# Patient Record
Sex: MALE | Race: WHITE | NOT HISPANIC OR LATINO | URBAN - METROPOLITAN AREA
[De-identification: names, ages, dates, MRNs, and addresses within clinical notes are randomized per-mention and may not be internally consistent; named-entity substitution may affect disease eponyms.]

---

## 2017-10-19 ENCOUNTER — OFFICE VISIT - HEALTHEAST (OUTPATIENT)
Dept: UROLOGY | Facility: CLINIC | Age: 22
End: 2017-10-19

## 2017-10-19 DIAGNOSIS — N20.9 URINARY TRACT STONES: ICD-10-CM

## 2017-10-19 DIAGNOSIS — N20.1 CALCULUS OF URETER: ICD-10-CM

## 2017-10-19 DIAGNOSIS — N13.2 HYDRONEPHROSIS WITH URINARY OBSTRUCTION DUE TO URETERAL CALCULUS: ICD-10-CM

## 2017-10-19 DIAGNOSIS — N20.0 CALCULUS OF KIDNEY: ICD-10-CM

## 2017-12-22 ENCOUNTER — HOSPITAL ENCOUNTER (OUTPATIENT)
Dept: CT IMAGING | Facility: CLINIC | Age: 22
Discharge: HOME | End: 2017-12-22
Attending: SPECIALIST

## 2017-12-22 ENCOUNTER — OFFICE VISIT - HEALTHEAST (OUTPATIENT)
Dept: UROLOGY | Facility: CLINIC | Age: 22
End: 2017-12-22

## 2017-12-22 DIAGNOSIS — N20.0 CALCULUS OF KIDNEY: ICD-10-CM

## 2017-12-22 DIAGNOSIS — N23 RENAL COLIC: ICD-10-CM

## 2017-12-22 DIAGNOSIS — N20.1 CALCULUS OF URETER: ICD-10-CM

## 2017-12-29 ENCOUNTER — ANESTHESIA - HEALTHEAST (OUTPATIENT)
Dept: SURGERY | Facility: CLINIC | Age: 22
End: 2017-12-29

## 2017-12-29 ENCOUNTER — SURGERY - HEALTHEAST (OUTPATIENT)
Dept: SURGERY | Facility: CLINIC | Age: 22
End: 2017-12-29

## 2017-12-29 ASSESSMENT — MIFFLIN-ST. JEOR: SCORE: 1963.55

## 2018-01-04 ENCOUNTER — AMBULATORY - HEALTHEAST (OUTPATIENT)
Dept: UROLOGY | Facility: CLINIC | Age: 23
End: 2018-01-04

## 2018-01-04 DIAGNOSIS — N20.9 URINARY TRACT STONES: ICD-10-CM

## 2018-01-04 DIAGNOSIS — N20.1 CALCULUS OF URETER: ICD-10-CM

## 2018-01-04 LAB
ALBUMIN UR-MCNC: ABNORMAL MG/DL
APPEARANCE UR: ABNORMAL
BILIRUB UR QL STRIP: NEGATIVE
COLOR UR AUTO: ABNORMAL
GLUCOSE UR STRIP-MCNC: NEGATIVE MG/DL
HGB UR QL STRIP: ABNORMAL
KETONES UR STRIP-MCNC: NEGATIVE MG/DL
LEUKOCYTE ESTERASE UR QL STRIP: ABNORMAL
NITRATE UR QL: NEGATIVE
PH UR STRIP: 6 [PH] (ref 5–8)
SP GR UR STRIP: >=1.03 (ref 1–1.03)
UROBILINOGEN UR STRIP-ACNC: ABNORMAL

## 2018-01-05 LAB — BACTERIA SPEC CULT: NO GROWTH

## 2018-02-01 ENCOUNTER — OFFICE VISIT - HEALTHEAST (OUTPATIENT)
Dept: UROLOGY | Facility: CLINIC | Age: 23
End: 2018-02-01

## 2018-02-01 ENCOUNTER — AMBULATORY - HEALTHEAST (OUTPATIENT)
Dept: LAB | Facility: CLINIC | Age: 23
End: 2018-02-01

## 2018-02-01 DIAGNOSIS — N20.9 URINARY TRACT STONES: ICD-10-CM

## 2018-02-01 DIAGNOSIS — N20.9 URINARY CALCULUS: ICD-10-CM

## 2018-02-01 DIAGNOSIS — N20.0 CALCULUS OF KIDNEY: ICD-10-CM

## 2018-02-01 LAB
ALBUMIN SERPL-MCNC: 4 G/DL (ref 3.5–5)
ALBUMIN UR-MCNC: NEGATIVE MG/DL
ANION GAP SERPL CALCULATED.3IONS-SCNC: 9 MMOL/L (ref 5–18)
APPEARANCE UR: CLEAR
BILIRUB UR QL STRIP: NEGATIVE
BUN SERPL-MCNC: 19 MG/DL (ref 8–22)
CALCIUM SERPL-MCNC: 9.6 MG/DL (ref 8.5–10.5)
CALCIUM SERPL-MCNC: 9.6 MG/DL (ref 8.5–10.5)
CALCIUM, IONIZED MEASURED: 1.2 MMOL/L (ref 1.11–1.3)
CHLORIDE BLD-SCNC: 107 MMOL/L (ref 98–107)
CO2 SERPL-SCNC: 26 MMOL/L (ref 22–31)
COLOR UR AUTO: YELLOW
CREAT SERPL-MCNC: 1.19 MG/DL (ref 0.7–1.3)
CREAT SERPL-MCNC: 1.25 MG/DL (ref 0.7–1.3)
GFR SERPL CREATININE-BSD FRML MDRD: >60 ML/MIN/1.73M2
GFR SERPL CREATININE-BSD FRML MDRD: >60 ML/MIN/1.73M2
GLUCOSE BLD-MCNC: 86 MG/DL (ref 70–125)
GLUCOSE UR STRIP-MCNC: NEGATIVE MG/DL
HGB UR QL STRIP: NEGATIVE
ION CA PH 7.4: 1.18 MMOL/L (ref 1.11–1.3)
KETONES UR STRIP-MCNC: NEGATIVE MG/DL
LEUKOCYTE ESTERASE UR QL STRIP: NEGATIVE
MAGNESIUM SERPL-MCNC: 2.1 MG/DL (ref 1.8–2.6)
NITRATE UR QL: NEGATIVE
PH UR STRIP: 6.5 [PH] (ref 5–8)
PH: 7.36 (ref 7.35–7.45)
PHOSPHATE SERPL-MCNC: 3.1 MG/DL (ref 2.5–4.5)
PHOSPHATE SERPL-MCNC: 3.2 MG/DL (ref 2.5–4.5)
POTASSIUM BLD-SCNC: 3.9 MMOL/L (ref 3.5–5)
PTH-INTACT SERPL-MCNC: 27 PG/ML (ref 10–86)
SODIUM SERPL-SCNC: 142 MMOL/L (ref 136–145)
SP GR UR STRIP: 1.02 (ref 1–1.03)
URATE SERPL-MCNC: 7.1 MG/DL (ref 3–8)
UROBILINOGEN UR STRIP-ACNC: NORMAL

## 2021-05-31 VITALS — BODY MASS INDEX: 30.53 KG/M2 | HEIGHT: 70 IN | WEIGHT: 213.25 LBS

## 2021-06-13 NOTE — PROGRESS NOTES
Assessment/Plan:        Diagnoses and all orders for this visit:    Calculus of ureter    Urinary tract stones  -     Urinalysis Macroscopic    Calculus of kidney  -     Stone Formation, 24 Hour Urine x2; Standing  -     Renal Function Profile; Future; Expected date: 11/2/17  -     Uric Acid; Future; Expected date: 11/2/17  -     Magnesium; Future; Expected date: 11/2/17  -     Calcium, Ionized, Measured; Future; Expected date: 11/2/17  -     Parathyroid Hormone Intact with Minerals; Future; Expected date: 11/2/17  -     Patient Stated Goal: Prevent further stones  -     Patient Stated Goal: Manage future stone episodes  -     Patient Stated Goal: Pass my stone  -     24 Hour Urine Collection Steps Education    Hydronephrosis with urinary obstruction due to ureteral calculus      Stone Management Plan  [facility] 10/19/2017   Urinary Tract Infection No suspicion of infection   Renal Colic Asymptomatic at this time   Renal Failure No suspicion of renal failure   Current CT date 8/25/2017   Right sided stones? Yes   R Number of ureteral stones 1   R GSD of ureteral stones 4   R Location of ureteral stone Distal   R Number of kidney stones  1   R GSD of kidney stones < 2   R Hydronephrosis Mild   R Stone Event New event   Diagnosis date 8/25/2017   Initial location of primary symptomatic stone Distal   Initial GSD of primary symptomatic stone 4   R Current Plan MET   Left sided stones? Yes   L Number of kidney stones  2   L GSD of kidney stones < 2   L Hydronephrosis None   L Stone Event No current event   L Current Plan Observe   Observe rationale Limited stone burden with good prognosis for spontaneous passage             Subjective:      HPI  Mr. Kendall Landis is a 22 y.o.  male presenting to the [facility] [place] referred by [facility] for urolithiasis.    He is a first time unidentified composition stone former. He has no identified modifiable stone risk factors. He has  identified non-modifiable stone risks including:  early age at first stone and limited family history.    He was evaluated through [facility] 8/25/17 for complaint of intermittent, mid abdominal pain x 3 weeks. The pain was achy but at times stabbing. At its worst, it was 8/10 with no associated alleviating or aggravating factors. Significant associated symptoms at presentation included:  urinary frequency and urinary urgency.   Workup included labs which demonstrated microscopic hematuria without signs of infection, and mild renal insufficiency. A CT scan reported a 5 mm right distal ureteral stone and additional bilateral renal stones. He was discharged home with flomax, zofran, and norco. He has not seen a stone pass. He has had no pain or related symptoms over past 3 weeks.     He is asymptomatic at present. He denies symptoms of fever, chills, flank pain, nausea, vomiting, urinary frequency and dysuria.     CT scan from 8/25/17 is personally reviewed and demonstrates a mildly obstructing 4-5 mm right distal ureteral calculus. There is a < 2 mm right renal papillary tip calcification. Within the left kidney, there a a couple punctate papillary tip calcifications.    Significant labs from presentation include mild hematuria, no pyuria, negative nitrite, no bacteria, normal WBC, slightly elevated creatinine (1.30) and normal potassium; venous calcium elevated 10.6    PLAN    23 yo healthy male asymptomatic, diagnosed 2 months ago with an obstructing right distal ureteral calculus, no specimen retrieved. Additional punctate bilateral renal calculi with familial history of stone disease.    Given Kendall has been asymptomatic for past several weeks, will proceed a comprehensive stone risk evaluation. Serum stone risk chemistries including parathyroid hormone and ionized calcium will be obtained before next visit. Two 24 hour urine collections and dietary journal will be obtained at earliest covenience. Patient verbalized  understanding. Patient agrees with plan as discussed. He will return to clinic when labs are available.      If he has recurrent pain or symptoms, he will call our office and may require follow up imaging, but will avoid at this time.    For symptom control, he has vicodin and ondansetron available. Over the counter symptom control medications of ibuprofen and Dramamine were recommended.    Patient also seen and examined by JOÃO Hanson   Review of Systems  A 12 point comprehensive review of systems is negative except for HPI    Past Medical History:   Diagnosis Date     Kidney stone      Medical history non-contributory        Past Surgical History:   Procedure Laterality Date     NO PAST SURGERIES         No current outpatient prescriptions on file.     No current facility-administered medications for this visit.        No Known Allergies    Social History     Social History     Marital status: Single     Spouse name: N/A     Number of children: N/A     Years of education: N/A     Occupational History           Social History Main Topics     Smoking status: Never Smoker     Smokeless tobacco: Never Used     Alcohol use Yes      Comment: occas     Drug use: No     Sexual activity: Not on file     Other Topics Concern     Not on file     Social History Narrative       Family History   Problem Relation Age of Onset     Urolithiasis Father      Clotting disorder Paternal Uncle      Cancer Maternal Grandmother      Urolithiasis Paternal Grandmother      Diabetes Neg Hx      Gout Neg Hx      Heart disease Neg Hx        Objective:      Physical Exam  Vitals:    10/19/17 1354   BP: 127/90   Pulse: 68   Temp: 98  F (36.7  C)     General - well developed, well nourished, appropriate for age. Appears no distress at this time   Heart - regular rate and rhythm, no murmur  Respiratory - normal effort, clear to auscultation, good air entry without adventitious noises  Abdomen - slender soft, non-tender,  no hepatosplenomegaly, no masses.   - no flank tenderness, no suprapubic tenderness, kidney and bladder non-palpable  MSK - normal spinal curvature. no spinal tenderness. normal gait. muscular strength intact.  Neurology - cranial nerves II-XII grossly intact, normal sensation, no unsteadiness  Skin - intact, no bruising, no gouty tophi  Psych - oriented to time, place, and person, normal mood and affect.        Labs  Urinalysis POC (Office):  Nitrite, UA   Date Value Ref Range Status   10/19/2017 Negative Negative Final       Lab Urinalysis:  Blood, UA   Date Value Ref Range Status   10/19/2017 Negative Negative Final     Nitrite, UA   Date Value Ref Range Status   10/19/2017 Negative Negative Final     Leukocytes, UA   Date Value Ref Range Status   10/19/2017 Negative Negative Final     pH, UA   Date Value Ref Range Status   10/19/2017 7.0 5.0 - 8.0 Final

## 2021-06-14 NOTE — PROGRESS NOTES
Assessment/Plan:        Diagnoses and all orders for this visit:    Calculus of ureter  -     CT Abdomen Pelvis Without Oral Without IV Contrast; Future; Expected date: 12/22/17  -     Urinalysis Macroscopic  -     Place sequential compression device; Standing  -     tamsulosin (FLOMAX) 0.4 mg Cp24; Take 1 capsule (0.4 mg total) by mouth daily for 14 days.  Dispense: 14 capsule; Refill: 1  -     HYDROmorphone (DILAUDID) 2 MG tablet; Take 1-2 tablets (2-4 mg total) by mouth every 4 (four) hours as needed for pain.  Dispense: 30 tablet; Refill: 0  -     Patient Stated Goal: Know what to expect after surgery  -     Ureteroscopy Education  -     XR Abdomen AP; Standing  -     Diet NPO; Standing  -     Verify informed consent; Standing  -     levoFLOXacin 500 mg/100 mL IVPB 500 mg (LEVAQUIN); Infuse 100 mL (500 mg total) into a venous catheter 60 minutes before surgery or procedure (On Call to OR).    Renal colic  -     CT Abdomen Pelvis Without Oral Without IV Contrast; Future; Expected date: 12/22/17  -     Urinalysis Macroscopic    Calculus of kidney  -     Ureteroscopy Education    Other orders  -     UNABLE TO FIND; Med Name:       Stone Management Plan  [facility] 10/19/2017 12/22/2017   Urinary Tract Infection No suspicion of infection No suspicion of infection   Renal Colic Asymptomatic at this time Asymptomatic at this time   Renal Failure No suspicion of renal failure No suspicion of renal failure   Current CT date 8/25/2017 12/22/2017   Right sided stones? Yes Yes   R Number of ureteral stones 1 1   R GSD of ureteral stones 4 5   R Location of ureteral stone Distal Distal   R Number of kidney stones  1 1   R GSD of kidney stones < 2 2 - 4   R Hydronephrosis Mild None   R Stone Event New event Established event   Diagnosis date 8/25/2017 -   Initial location of primary symptomatic stone Distal -   Initial GSD of primary symptomatic stone 4 -   R Current Plan MET Clear   Clear rationale -  MET failure   Left sided stones? Yes No   L Number of kidney stones  2 -   L GSD of kidney stones < 2 -   L Hydronephrosis None -   L Stone Event No current event No current event   L Current Plan Observe -   Observe rationale Limited stone burden with good prognosis for spontaneous passage -         Subjective:      HPI  Mr. Kendall Landis is a 22 y.o.  male returning to the [facility] [place] for unanticipated visit with acute exacerbation of chronic stone disease.      He is a first time unidentified composition stone former who has not required stone clearance procedures. He has not previously participated in stone risk evaluation. He has no identified modifiable stone risk factors. He has identified non-modifiable stone risks including:  early age at first stone and limited family history.    Kendall was last seen in August following an urgent care visit with diagnosed right distal ureteral calculus. He had been asymptomatic for weeks but had not captured a stone. He was to follow up after completing stone risk workup or return if he had recurrent symptoms. He states he has experienced mild intermittent pain over the past 4 months. However, over the last few days, the right flank pain has increased in severity. He has not required medications for relief. He notes episodic urinary urgency and frequency. He is asymptomatic at present. He denies symptoms of fever, chills, flank pain, nausea, vomiting, urinary frequency and dysuria.    CT scan from today is personally reviewed and demonstrates persistent right distal ureteral calculus, 5 mm. No current hydronephrosis. Slight growth of previous right upper pole calculus, now 2 mm. No discrete left renal calculi.    Significant labs from presentation include no hematuria, no pyuria, negative nitrite and no bacteria.    PLAN    23 yo healthy male with long standing, non-progressing, right distal ureteral calculus. Additional right renal  calculus.    Will proceed with ureterscopic stone clearance next week. Risks and benefits were detailed of ureteroscopic stone clearance including potential issues of urinary or systemic infection, ureteral injury, inaccessible stone, incomplete stone clearance, multiple surgeries, and stent related symptoms of urgency, frequency and hematuria Patient verbalized understanding. Patient agrees with plan as discussed. Preoperative evaluation with primary care is not requested as the referring documentation is adequate.    For symptom control, he was prescribed dilaudid and Flomax. Over the counter symptom control medications of ibuprofen, Dramamine and Tylenol were recommended.    Patient also seen and examined by JOÃO Hanson   Review of systems is negative except for HPI.    Past Medical History:   Diagnosis Date     Kidney stone      Medical history non-contributory        Past Surgical History:   Procedure Laterality Date     NO PAST SURGERIES         Current Outpatient Prescriptions   Medication Sig Dispense Refill     UNABLE TO FIND Med Name:        No current facility-administered medications for this visit.        No Known Allergies    Social History     Social History     Marital status: Single     Spouse name: N/A     Number of children: N/A     Years of education: N/A     Occupational History           Social History Main Topics     Smoking status: Never Smoker     Smokeless tobacco: Never Used     Alcohol use Yes      Comment: occas     Drug use: No     Sexual activity: Not on file     Other Topics Concern     Not on file     Social History Narrative       Family History   Problem Relation Age of Onset     Urolithiasis Father      Clotting disorder Paternal Uncle      Cancer Maternal Grandmother      Urolithiasis Paternal Grandmother      Diabetes Neg Hx      Gout Neg Hx      Heart disease Neg Hx      Objective:      Physical Exam  Vitals:    12/22/17 1030   BP: 125/81    Pulse: (!) 56   Temp: 98  F (36.7  C)     General - well developed, well nourished, appropriate for age. Appears no distress at this time  Abdomen - slender soft, non-tender, no hepatosplenomegaly, no masses.   - no flank tenderness, no suprapubic tenderness, kidney and bladder non-palpable  MSK - normal spinal curvature. no spinal tenderness. normal gait. muscular strength intact.  Psych - oriented to time, place, and person, normal mood and affect.        Labs   Urinalysis POC (Office):  Nitrite, UA   Date Value Ref Range Status   12/22/2017 Negative Negative Final   10/19/2017 Negative Negative Final       Lab Urinalysis:  Blood, UA   Date Value Ref Range Status   12/22/2017 Negative Negative Final   10/19/2017 Negative Negative Final     Nitrite, UA   Date Value Ref Range Status   12/22/2017 Negative Negative Final   10/19/2017 Negative Negative Final     Leukocytes, UA   Date Value Ref Range Status   12/22/2017 Negative Negative Final   10/19/2017 Negative Negative Final     pH, UA   Date Value Ref Range Status   12/22/2017 7.0 5.0 - 8.0 Final   10/19/2017 7.0 5.0 - 8.0 Final

## 2021-06-15 NOTE — PROGRESS NOTES
Assessment/Plan:        Diagnoses and all orders for this visit:    Calculus of ureter  -     Cystoscopy with Stent Removal Education    Urinary tract stones  -     Urinalysis Macroscopic  -     Culture, Urine- Future; Future; Expected date: 2/3/18  -     Culture, Urine- Future  -     lidocaine HCl 2 % topical jelly 10 mL (UROJET); Insert 10 mL into the urethra once.  -     ciprofloxacin HCl tablet 250 mg (CIPRO); Take 1 tablet (250 mg total) by mouth once.    Other orders  -     IBUPROFEN ORAL; Take by mouth.  -     ciprofloxacin HCl (CIPRO) 250 MG tablet;   -     lidocaine HCl (UROJET) 2 % topical jelly;       Stone Management Plan  [facility] [place] [facility] 10/19/2017 12/22/2017 1/4/2018   Urinary Tract Infection No suspicion of infection No suspicion of infection No suspicion of infection   Renal Colic Asymptomatic at this time Asymptomatic at this time Well controlled symptoms   Renal Failure No suspicion of renal failure No suspicion of renal failure No suspicion of renal failure   Current CT date 8/25/2017 12/22/2017 -   Right sided stones? Yes Yes -   R Number of ureteral stones 1 1 -   R GSD of ureteral stones 4 5 -   R Location of ureteral stone Distal Distal -   R Number of kidney stones  1 1 -   R GSD of kidney stones < 2 2 - 4 -   R Hydronephrosis Mild None -   R Stone Event New event Established event Established event   Diagnosis date 8/25/2017 - -   Initial location of primary symptomatic stone Distal - -   Initial GSD of primary symptomatic stone 4 - -   R Post-op status - - Stent Removal   R Current Plan MET Clear -   Clear rationale - MET failure -   Left sided stones? Yes No -   L Number of kidney stones  2 - -   L GSD of kidney stones < 2 - -   L Hydronephrosis None - -   L Stone Event No current event No current event No current event   L Current Plan Observe - -   Observe rationale Limited stone burden with good prognosis for spontaneous passage - -             Subjective:      HPI  Mr. Argueta DINORAH  Sabiha is a 22 y.o.  male returning to the [facility] [place] for early postoperative follow up for anticipated stent removal.     He returns status post right ureteroscopic extraction for distal ureteral stone. He has had no unanticipated post-operative events.    He has had no symptoms suspicious for infection and stent was mildly symptomatic with typical issues of flank discomfort and lower urinary tract irritation.     Flexible cystoscopy is performed and indwelling stent is removed without incident.    He will follow up in the office in one month without imaging.       ROS   Review of systems is negative except for HPI.    Past Medical History:   Diagnosis Date     Kidney stone      Medical history non-contributory        Past Surgical History:   Procedure Laterality Date     MS CYSTO/URETERO W/LITHOTRIPSY &INDWELL STENT INSRT Right 12/29/2017    Procedure: CYSTOSCOPY, RIGHT URETEROSCOPY STENT INSERTION;  Surgeon: Luis Armando Freire MD;  Location: [facility];  Service: Urology     WISDOM TOOTH EXTRACTION         Current Outpatient Prescriptions   Medication Sig Dispense Refill     HYDROmorphone (DILAUDID) 2 MG tablet Take 1-2 tablets (2-4 mg total) by mouth every 4 (four) hours as needed for pain. 30 tablet 0     IBUPROFEN ORAL Take by mouth.       tamsulosin (FLOMAX) 0.4 mg Cp24 Take 1 capsule (0.4 mg total) by mouth daily for 14 days. 14 capsule 1     Current Facility-Administered Medications   Medication Dose Route Frequency Provider Last Rate Last Dose     ciprofloxacin HCl (CIPRO) 250 MG tablet              lidocaine HCl (UROJET) 2 % topical jelly                No Known Allergies    Social History     Social History     Marital status: Single     Spouse name: N/A     Number of children: N/A     Years of education: N/A     Occupational History           Social History Main Topics     Smoking status: Never Smoker     Smokeless tobacco: Never Used     Alcohol  use Yes      Comment: occas     Drug use: No     Sexual activity: Not on file     Other Topics Concern     Not on file     Social History Narrative       Family History   Problem Relation Age of Onset     Urolithiasis Father      Clotting disorder Paternal Uncle      Cancer Maternal Grandmother      Urolithiasis Paternal Grandmother      Diabetes Neg Hx      Gout Neg Hx      Heart disease Neg Hx      Objective:      Physical Exam  Vitals:    01/04/18 1406   BP: 147/74   Pulse: (!) 55   Temp: 97.8  F (36.6  C)     General - well developed, well nourished, appropriate for age. Appears no distress at this time  Abdomen - mildly obese soft, non-tender, no hepatosplenomegaly, no masses.   - no flank tenderness, no suprapubic tenderness, kidney and bladder non-palpable  MSK - normal spinal curvature. no spinal tenderness. normal gait. muscular strength intact.  Psych - oriented to time, place, and person, normal mood and affect.        Labs   Urinalysis POC (Office):  Nitrite, UA   Date Value Ref Range Status   01/04/2018 Negative Negative Final   12/22/2017 Negative Negative Final   10/19/2017 Negative Negative Final       Lab Urinalysis:  Blood, UA   Date Value Ref Range Status   01/04/2018 Large (!) Negative Final   12/22/2017 Negative Negative Final   10/19/2017 Negative Negative Final     Nitrite, UA   Date Value Ref Range Status   01/04/2018 Negative Negative Final   12/22/2017 Negative Negative Final   10/19/2017 Negative Negative Final     Leukocytes, UA   Date Value Ref Range Status   01/04/2018 Small (!) Negative Final   12/22/2017 Negative Negative Final   10/19/2017 Negative Negative Final     pH, UA   Date Value Ref Range Status   01/04/2018 6.0 5.0 - 8.0 Final   12/22/2017 7.0 5.0 - 8.0 Final   10/19/2017 7.0 5.0 - 8.0 Final

## 2021-06-15 NOTE — ANESTHESIA PREPROCEDURE EVALUATION
Anesthesia Evaluation      Patient summary reviewed     Airway   Mallampati: II   Pulmonary - negative ROS and normal exam                          Cardiovascular - negative ROS and normal exam   Neuro/Psych - negative ROS     Endo/Other - negative ROS      GI/Hepatic/Renal    (+)   chronic renal disease,     Comments: nephrolithiasis          Dental - normal exam                        Anesthesia Plan  Planned anesthetic: general LMA    ASA 1         Post-op plan: routine recovery

## 2021-06-15 NOTE — ANESTHESIA POSTPROCEDURE EVALUATION
Patient: Kendall Landis  CYSTOSCOPY, RIGHT URETEROSCOPY STENT INSERTION  Anesthesia type: general    Patient location: Phase II Recovery  Last vitals:   Vitals:    12/29/17 1200   BP: 127/66   Pulse: (!) 53   Resp: 16   Temp:    SpO2: 100%     Post vital signs: stable  Level of consciousness: awake and responds to simple questions  Post-anesthesia pain: pain controlled  Post-anesthesia nausea and vomiting: no  Pulmonary: unassisted, return to baseline  Cardiovascular: stable and blood pressure at baseline  Hydration: adequate  Anesthetic events: no    QCDR Measures:  ASA# 11 - Roseline-op Cardiac Arrest: ASA11B - Patient did NOT experience unanticipated cardiac arrest  ASA# 12 - Roseline-op Mortality Rate: ASA12B - Patient did NOT die  ASA# 13 - PACU Re-Intubation Rate: ASA13B - Patient did NOT require a new airway mgmt  ASA# 10 - Composite Anes Safety: ASA10A - No serious adverse event    Additional Notes:

## 2021-06-15 NOTE — ANESTHESIA CARE TRANSFER NOTE
Last vitals:   Vitals:    12/29/17 1022   BP: 129/59   Pulse: (!) 56   Resp: 16   Temp: 36.7  C (98  F)   SpO2: 98%     Patient's level of consciousness is drowsy  Spontaneous respirations: yes  Maintains airway independently: yes  Dentition unchanged: yes  Oropharynx: oropharynx clear of all foreign objects    QCDR Measures:  ASA# 20 - Surgical Safety Checklist: WHO surgical safety checklist completed prior to induction  PQRS# 430 - Adult PONV Prevention: 4558F - Pt received => 2 anti-emetic agents (different classes) preop & intraop  ASA# 8 - Peds PONV Prevention: NA - Not pediatric patient, not GA or 2 or more risk factors NOT present  PQRS# 424 - Roseline-op Temp Management: 4559F - At least one body temp DOCUMENTED => 35.5C or 95.9F within required timeframe  PQRS# 426 - PACU Transfer Protocol: - Transfer of care checklist used  ASA# 14 - Acute Post-op Pain: ASA14B - Patient did NOT experience pain >= 7 out of 10

## 2021-06-15 NOTE — PROGRESS NOTES
Assessment/Plan:        Diagnoses and all orders for this visit:    Urinary tract stones  -     Urinalysis Macroscopic  -     Patient Stated Goal: Prevent further stones  -     24 Hour Urine Collection Steps Education    Urinary calculus  -     Patient Stated Goal: Prevent further stones  -     24 Hour Urine Collection Steps Education      Stone Management Plan22  \[facility]\"" Stone Management 12/22/2017 1/4/2018 2/1/2018   Urinary Tract Infection No suspicion of infection No suspicion of infection No suspicion of infection   Renal Colic Asymptomatic at this time Well controlled symptoms Asymptomatic at this time   Renal Failure No suspicion of renal failure No suspicion of renal failure No suspicion of renal failure   Current CT date 12/22/2017 - -   Right sided stones? Yes - -   R Number of ureteral stones 1 - -   R GSD of ureteral stones 5 - -   R Location of ureteral stone Distal - -   R Number of kidney stones  1 - -   R GSD of kidney stones 2 - 4 - -   R Hydronephrosis None - -   R Stone Event Established event Established event Resolved event   Diagnosis date - - -   Initial location of primary symptomatic stone - - -   Initial GSD of primary symptomatic stone - - -   Resolved date - - 12/29/2017   R Post-op status - Stent Removal -   R Current Plan Clear - -   Clear rationale MET failure - -   Left sided stones? No - -   L Number of kidney stones  - - -   L GSD of kidney stones - - -   L Hydronephrosis - - -   L Stone Event No current event No current event No current event   L Current Plan - - -   Observe rationale - - -             Subjective:      HPI  Mr. Kendall Landis is a 22 y.o.  male returning to the [facility] [place] for follow-up removal right distal ureteral stone 12/29/2017 with Dr. Freire.  Stent was out on January 4, 2018.  Patient comes in today completely asymptomatic without dysuria urgency frequency abdominal or flank pain.  UA today specific gravity 1.020 pH 6.5  negative for nitrate blood and leukocytes.    Stone analysis 90% calcium oxalate 10% appetite.  Family history positive in father and grandmother on his father's side.  On patient's original CT 12/22/2017 he had what appeared to be a punctate stone in the right and left kidney in addition to the ureteral stone that required surgical removal.    Impression postop right ureteral stone removal 12/29/2017 punctate stones right and left kidney numbering one each, family history of urolithiasis    Plan; patient has stone risk study material in his possession that was given him in anticipation of his passing the stone spontaneously which he did not.   He will proceed with stone risk studies with disposition to follow.           ROS   Review of systems is negative except for HPI.    Past Medical History:   Diagnosis Date     Kidney stone      Medical history non-contributory        Past Surgical History:   Procedure Laterality Date     MS CYSTO/URETERO W/LITHOTRIPSY &INDWELL STENT INSRT Right 12/29/2017    Procedure: CYSTOSCOPY, RIGHT URETEROSCOPY STENT INSERTION;  Surgeon: Luis Armando Freire MD;  Location: [facility];  Service: Urology     WISDOM TOOTH EXTRACTION         No current outpatient prescriptions on file.     No current facility-administered medications for this visit.        No Known Allergies    Social History     Social History     Marital status: Single     Spouse name: N/A     Number of children: N/A     Years of education: N/A     Occupational History           Social History Main Topics     Smoking status: Never Smoker     Smokeless tobacco: Never Used     Alcohol use Yes      Comment: occas     Drug use: No     Sexual activity: Not on file     Other Topics Concern     Not on file     Social History Narrative       Family History   Problem Relation Age of Onset     Urolithiasis Father      Clotting disorder Paternal Uncle      Cancer Maternal Grandmother      Urolithiasis Paternal  Grandmother      Diabetes Neg Hx      Gout Neg Hx      Heart disease Neg Hx      Objective:      Physical Exam  Vitals:    02/01/18 1509   BP: 133/73   Pulse: 73   Temp: 98.1  F (36.7  C)     General - well developed, well nourished, appropriate for age. Appears no distress at this time  Abdomen - slender soft, non-tender, no hepatosplenomegaly, no masses.   - no flank tenderness, no suprapubic tenderness, kidney and bladder non-palpable  MSK - normal spinal curvature. no spinal tenderness. normal gait. muscular strength intact.  Psych - oriented to time, place, and person, normal mood and affect.      Labs   Urinalysis POC (Office):  Nitrite, UA   Date Value Ref Range Status   02/01/2018 Negative Negative Final   01/04/2018 Negative Negative Final   12/22/2017 Negative Negative Final       Lab Urinalysis:  Blood, UA   Date Value Ref Range Status   02/01/2018 Negative Negative Final   01/04/2018 Large (!) Negative Final   12/22/2017 Negative Negative Final     Nitrite, UA   Date Value Ref Range Status   02/01/2018 Negative Negative Final   01/04/2018 Negative Negative Final   12/22/2017 Negative Negative Final     Leukocytes, UA   Date Value Ref Range Status   02/01/2018 Negative Negative Final   01/04/2018 Small (!) Negative Final   12/22/2017 Negative Negative Final     pH, UA   Date Value Ref Range Status   02/01/2018 6.5 5.0 - 8.0 Final   01/04/2018 6.0 5.0 - 8.0 Final   12/22/2017 7.0 5.0 - 8.0 Final

## 2021-06-16 PROBLEM — N13.2 HYDRONEPHROSIS WITH URINARY OBSTRUCTION DUE TO URETERAL CALCULUS: Status: ACTIVE | Noted: 2017-10-19

## 2021-06-16 PROBLEM — N20.0 CALCULUS OF KIDNEY: Status: ACTIVE | Noted: 2017-10-19

## 2021-06-16 PROBLEM — N20.1 CALCULUS OF URETER: Status: ACTIVE | Noted: 2017-10-19

## 2021-06-16 PROBLEM — N20.9 URINARY TRACT STONES: Status: ACTIVE | Noted: 2017-10-19
